# Patient Record
Sex: MALE | Race: OTHER | Employment: OTHER | URBAN - METROPOLITAN AREA
[De-identification: names, ages, dates, MRNs, and addresses within clinical notes are randomized per-mention and may not be internally consistent; named-entity substitution may affect disease eponyms.]

---

## 2017-06-24 PROBLEM — E87.1 HYPONATREMIA: Status: ACTIVE | Noted: 2017-06-24

## 2017-06-24 PROBLEM — I10 HTN (HYPERTENSION): Status: ACTIVE | Noted: 2017-06-24

## 2017-06-24 PROBLEM — N39.0 UTI (URINARY TRACT INFECTION): Status: ACTIVE | Noted: 2017-06-24

## 2017-06-24 PROBLEM — A41.9 SEPSIS (HCC): Status: ACTIVE | Noted: 2017-06-24

## 2018-09-26 PROBLEM — N39.0 UTI (URINARY TRACT INFECTION): Status: RESOLVED | Noted: 2017-06-24 | Resolved: 2018-09-26

## 2022-11-07 ENCOUNTER — HOSPITAL ENCOUNTER (OUTPATIENT)
Dept: PHYSICAL THERAPY | Age: 72
Setting detail: THERAPIES SERIES
Discharge: HOME | End: 2022-11-07
Payer: COMMERCIAL

## 2022-11-07 PROCEDURE — 97161 PT EVAL LOW COMPLEX 20 MIN: CPT | Performed by: PHYSICAL THERAPIST

## 2022-11-07 PROCEDURE — 97110 THERAPEUTIC EXERCISES: CPT | Performed by: PHYSICAL THERAPIST

## 2022-11-07 NOTE — PLAN OF CARE
The [facility] and [address], [address]  Phone: [phone]  Fax [phone]     Physical Therapy Certification    Dear  Luz Lockett,    We had the pleasure of evaluating the following patient for physical therapy services at [address] [place] [address]. A summary of our findings can be found in the initial assessment below. This includes our plan of care. If you have any questions or concerns regarding these findings, please do not hesitate to contact me at the office phone number checked above. Thank you for the referral.       Physician Signature:_______________________________Date:__________________  By signing above (or electronic signature), therapists plan is approved by physician    Patient: Víctor Madrigal   : 1950   MRN: [id number]  Referring Physician:        Evaluation Date: 2022      Medical Diagnosis Information:  Diagnosis: M54.2 (ICD-10-CM) - Cervicalgia   Treatment Diagnosis: M54.2 pain; decreased strength                                         Insurance information: PT Insurance Information: [employer]      Precautions/ Contra-indications:      C-SSRS Triggered by Intake questionnaire (Past 2 wk assessment):   [x] No, Questionnaire did not trigger screening.   [] Yes, Patient intake triggered further evaluation      [] C-SSRS Screening completed  [] PCP notified via Plan of Care  [] Emergency services notified     Latex Allergy:  [x]NO      []YES  Preferred Language for Healthcare:   []English       [x]other: Belarusian    SUBJECTIVE: Patient stated complaint: Pt had left shoulder surgery one year ago, RTC repair, very successful. He has pain in his neck and posterior bilateral shoulders. This increases as the day progresses. He spends time on the computer. +neck stiffness. - night pain. -sensation changes.      Relevant Medical History: HBP  Functional Disability Index: FOTO 53    Pain Scale: 4-5/10  Easing factors:    Provocative factors:  typing, sitting, position changes     Type: []Constant   [x]Intermittent  []Radiating []Localized []other:     Numbness/Tingling: negative    Occupation/School: Retired     Living Status/Prior Level of Function: Independent with ADLs and IADLs    OBJECTIVE:     CERV ROM     Cervical Flexion WNL    Cervical Extension 50% pain    Cervical SB 50% tight    Cervical rotation 50%         ROM Left Right   Shoulder Flex     Shoulder Abd     Shoulder ER     Shoulder IR     Grossly WNL          Strength  Left Right   Shoulder Flex     Shoulder Scap     Shoulder ER 4/5 4/5   Shoulder IR 4/5 4/5   Deep neck flexors          Reflexes Left Right   C5-6 Biceps     C5-6 Brachioradialis     C7-8 Triceps       Reflexes/Sensation:     []Dermatomes/Myotomes intact    []Reflexes equal and normal bilaterally   []Other:    Joint mobility:     [x]Normal    []Hypo   []Hyper    Palpation:      Functional Mobility/Transfers: WFL    Posture: mildly forward head    Bandages/Dressings/Incisions: NA     Gait: (include devices/WB status): WNL     Orthopedic Special Tests:                         [x] Patient history, allergies, meds reviewed. Medical chart reviewed. See intake form. Review Of Systems (ROS):  [x]Performed Review of systems (Integumentary, CardioPulmonary, Neurological) by intake and observation. Intake form has been scanned into medical record. Patient has been instructed to contact their primary care physician regarding ROS issues if not already being addressed at this time.       Co-morbidities/Complexities (which will affect course of rehabilitation):    []None           Arthritic conditions   []Rheumatoid arthritis (M05.9)  []Osteoarthritis (M19.91)   Cardiovascular conditions   [x]Hypertension (I10)  []Hyperlipidemia (E78.5)  []Angina pectoris (I20)  []Atherosclerosis (I70)  []CVA Musculoskeletal conditions   []Disc pathology   []Congenital spine pathologies []Prior surgical intervention  []Osteoporosis (M81.8)  []Osteopenia (M85.8)   Endocrine conditions   []Hypothyroid (E03.9)  []Hyperthyroid Gastrointestinal conditions   []Constipation (T82.94)   Metabolic conditions   []Morbid obesity (E66.01)  []Diabetes type 1(E10.65) or 2 (E11.65)   []Neuropathy (G60.9)     Pulmonary conditions   []Asthma (J45)  []Coughing   []COPD (J44.9)   Psychological Disorders  []Anxiety (F41.9)  []Depression (F32.9)   []Other:   []Other:          Barriers to/and or personal factors that will affect rehab potential:              []Age  []Sex   []Smoker              []Motivation/Lack of Motivation                        []Co-Morbidities              []Cognitive Function, education/learning barriers              []Environmental, home barriers              []profession/work barriers  []past PT/medical experience  []other:  Justification:  Pt has a good rehab potential.     Falls Risk Assessment (30 days):   [x] Falls Risk assessed and no intervention required.   [] Falls Risk assessed and Patient requires intervention due to being higher risk   TUG score (>12s at risk):     [] Falls education provided, including         ASSESSMENT:     Functional Impairments:     []Noted cervical/thoracic/GHJ joint hypomobility   []Noted cervical/thoracic/GHJ joint hypermobility   []Decreased cervical/UE functional ROM   []Noted Headache pain aggravated by neck movements with/without dizziness   []Abnormal reflexes/sensation/myotomal/dermatomal deficits   []Decreased DCF control or ability to hold head up   [x]Decreased RC/scapular/core strength and neuromuscular control    []Decreased UE functional strength   []other:      Functional Activity Limitations (from functional questionnaire and intake)   [x]Reduced ability to tolerate prolonged functional positions   [x]Reduced ability or difficulty with changes of positions or transfers between positions   []Reduced ability to maintain good posture and demonstrate good body mechanics with sitting, bending, and lifting   [x] Reduced ability or tolerance with driving and/or computer work   []Reduced ability to perform lifting, reaching, carrying tasks   []Reduced ability to concentrate   []Reduced ability to sleep    []Reduced ability to tolerate any impact through UE or spine   []Reduced ability to ambulate prolonged functional periods/distances   []other:    Participation Restrictions   []Reduced participation in self care activities   []Reduced participation in home management activities   []Reduced participation in work activities   [x]Reduced participation in social activities. []Reduced participation in sport/recreational activities. Classification/Subgrouping:   []signs/symptoms consistent with neck pain with mobility deficits     []signs/symptoms consistent with neck pain with movement coordinated impairments    []signs/symptoms consistent with neck pain with radiating pain    []signs/symptoms consistent with neck pain with headaches (cervicogenic)    []Signs/symptoms consistent with nerve root involvement including myotome & dermatome dysfunction   []sign/symptoms consistent with facet dysfunction of cervical and thoracic spine    []signs/symptoms consistent suggesting central cord compression/UMN syndromes   []signs/symptoms consistent with discogenic cervical pain   []signs/symptoms consistent with rib dysfunction   [x]signs/symptoms consistent with postural dysfunction   []signs/symptoms consistent with shoulder pathology    []signs/symptoms consistent with post-surgical status including decreased ROM, strength and function.    []signs/symptoms consistent with pathology which may benefit from Dry Needling   []signs/symptoms which may limit the use of advanced manual therapy techniques: (Elevated CV risk profile, recent trauma, intolerance to end range positions, prior TIA, visual issues, UE neurological compromise )     Prognosis/Rehab Potential: []Excellent   [x]Good    []Fair   []Poor    Tolerance of evaluation/treatment:    []Excellent   [x]Good    []Fair   []Poor    Physical Therapy Evaluation Complexity Justification  [x] A history of present problem with:  [x] no personal factors and/or comorbidities that impact the plan of care;  []1-2 personal factors and/or comorbidities that impact the plan of care  []3 personal factors and/or comorbidities that impact the plan of care  [x] An examination of body systems using standardized tests and measures addressing any of the following: body structures and functions (impairments), activity limitations, and/or participation restrictions;:  [] a total of 1-2 or more elements   [x] a total of 3 or more elements   [] a total of 4 or more elements   [x] A clinical presentation with:  [x] stable and/or uncomplicated characteristics   [] evolving clinical presentation with changing characteristics  [] unstable and unpredictable characteristics;   [x] Clinical decision making of [x] low, [] moderate, [] high complexity using standardized patient assessment instrument and/or measurable assessment of functional outcome. [x] EVAL (LOW) 23948 (typically 20 minutes face-to-face)  [] EVAL (MOD) 17529 (typically 30 minutes face-to-face)  [] EVAL (HIGH) 95766 (typically 45 minutes face-to-face)  [] RE-EVAL     PLAN:   Frequency/Duration:  1-2 days per week for 6 Weeks:  Interventions:  [x]  Therapeutic exercise including: strength training, ROM, for cervical spine,scapula, core and Upper extremity, including postural re-education. [x]  NMR activation and proprioception for Deep cervical flexors, periscapular and RC muscles and Core, including postural re-education. [x]  Manual therapy as indicated for C/T spine, ribs, Soft tissue to include: Dry Needling/IASTM, STM, PROM, Gr I-IV mobilizations, manipulation.    [x] Modalities as needed that may include: thermal agents, E-stim, Biofeedback, US, iontophoresis as indicated  [x] Patient education on joint protection, postural re-education, activity modification, progression of HEP. HEP instruction: Pt received a written HEP today. GOALS:  Patient stated goal: typing better  [] Progressing: [] Met: [] Not Met: [] Adjusted    Therapist goals for Patient:   Short Term Goals: To be achieved in: 2 weeks  1. Independent in HEP and progression per patient tolerance, in order to prevent re-injury. [] Progressing: [] Met: [] Not Met: [] Adjusted  2. Patient will have a decrease in pain to facilitate improvement in movement, function, and ADLs as indicated by Functional Deficits. [] Progressing: [] Met: [] Not Met: [] Adjusted    Long Term Goals: To be achieved in: 6 weeks  1. FOTO 63 to assist with reaching prior level of function. [] Progressing: [] Met: [] Not Met: [] Adjusted  2. Patient will demonstrate [facility] cervical/thoracic spine to allow for proper joint functioning as indicated by patients Functional Deficits. [] Progressing: [] Met: [] Not Met: [] Adjusted  3. Patient will demonstrate an increase in postural awareness and control and activation of  Deep cervical stabilizers to allow for proper functional mobility as indicated by patients Functional Deficits. [] Progressing: [] Met: [] Not Met: [] Adjusted   4. Patient will return to  functional activities without increased symptoms or restriction. [] Progressing: [] Met: [] Not Met: [] Adjusted  5. Pt will type without pain.   [] Progressing: [] Met: [] Not Met: [] Adjusted      Electronically signed by:  Collette Hone, PT

## 2022-11-07 NOTE — FLOWSHEET NOTE
The [address] [facility] and [address], [address]  Phone: [phone]  Fax [phone]      Physical Therapy Treatment Note/ Progress Report:       Date:  2022    Patient Name:  Saige Morgan    :  1950  MRN: [id number]  Restrictions/Precautions:    Medical/Treatment Diagnosis Information:  Diagnosis: M54.2 (ICD-10-CM) - Cervicalgia  Treatment Diagnosis: M54.2 pain; decreased strength  Insurance/Certification information:  PT Insurance Information: [facility]  Physician Information:   Fahad Lucero NP  Has the plan of care been signed (Y/N):        []  Yes  [x]  No     Date of Patient follow up with Physician:      Is this a Progress Report:     []  Yes  [x]  No        If Yes:  Date Range for reporting period:  Beginning 22  Ending     Progress report will be due (10 Rx or 30 days whichever is less):         Recertification will be due (POC Duration  / 90 days whichever is less):  23        Visit # Insurance Allowable Auth Required   In-person 1  30 []  Yes []  No    Telehealth     []  Yes []  No    Total        Functional Scale: FOTO=53    Date assessed:  22     Latex Allergy:  [x]NO      []YES  Preferred Language for Healthcare:   []English       [x]other: Yoruba    Pain level:  4-5/10     SUBJECTIVE:  See eval    OBJECTIVE: See eval  Observation:   Test measurements:      RESTRICTIONS/PRECAUTIONS:      Exercises/Interventions:   Therapeutic Ex        Sets/sec Reps Notes   Neck retraction 1x10     Scapular retraction [id number]     Standing extension [id number]     UT stretch [id number]     Prone HABD 3x10                                                                 Manual Intervention                                                     NMR re-education                                        Traction                                     Therapeutic Exercise and NMR EXR  [x] (88728) Provided verbal/tactile cueing for activities related to strengthening, flexibility, endurance, ROM  for improvements in cervical, postural, scapular, scapulothoracic and UE control with self care, reaching, carrying, lifting, house/yardwork, driving/computer work.    [] (79681) Provided verbal/tactile cueing for activities related to improving balance, coordination, kinesthetic sense, posture, motor skill, proprioception  to assist with cervical, scapular, scapulothoracic and UE control with self care, reaching, carrying, lifting, house/yardwork, driving/computer work. Therapeutic Activities:    [] (17556 or 15851) Provided verbal/tactile cueing for activities related to improving balance, coordination, kinesthetic sense, posture, motor skill, proprioception and motor activation to allow for proper function of cervical, scapular, scapulothoracic and UE control with self care, carrying, lifting, driving/computer work.      Home Exercise Program:    [x] (22443) Reviewed/Progressed HEP activities related to strengthening, flexibility, endurance, ROM of cervical, scapular, scapulothoracic and UE control with self care, reaching, carrying, lifting, house/yardwork, driving/computer work  [] (67815) Reviewed/Progressed HEP activities related to improving balance, coordination, kinesthetic sense, posture, motor skill, proprioception of cervical, scapular, scapulothoracic and UE control with self care, reaching, carrying, lifting, house/yardwork, driving/computer work      Manual Treatments:  PROM / STM / Oscillations-Mobs:  G-I, II, III, IV (PA's, Inf., Post.)  [] (49737) Provided manual therapy to mobilize soft tissue/joints of cervical/CT, scapular GHJ and UE for the purpose of decreasing headache, modulating pain, promoting relaxation,  increasing ROM, reducing/eliminating soft tissue swelling/inflammation/restriction, improving soft tissue extensibility and allowing for proper ROM for normal function with self care, reaching, carrying, lifting, house/yardwork, driving/computer work    Modalities:      Charges  Timed Code Treatment Minutes: 20   Total Treatment Minutes: 40     [x] EVAL (LOW) 36423   [] EVAL (MOD) 06269   [] EVAL (HIGH) 61698   [] RE-EVAL     [x] TA(14189) x  1   [] IONTO  [] NMR (80923) x     [] VASO  [] Manual (87625) x      [] Other:  [] TA x      [] Mech Traction (13290)  [] ES(attended) (13831)      [] ES (un) (62549):       GOALS:  Patient stated goal: typing better  [] Progressing: [] Met: [] Not Met: [] Adjusted     Therapist goals for Patient:   Short Term Goals: To be achieved in: 2 weeks  1. Independent in HEP and progression per patient tolerance, in order to prevent re-injury. [] Progressing: [] Met: [] Not Met: [] Adjusted  2. Patient will have a decrease in pain to facilitate improvement in movement, function, and ADLs as indicated by Functional Deficits. [] Progressing: [] Met: [] Not Met: [] Adjusted     Long Term Goals: To be achieved in: 6 weeks  1. FOTO 63 to assist with reaching prior level of function. [] Progressing: [] Met: [] Not Met: [] Adjusted  2. Patient will demonstrate increased AROM to [facility] of cervical/thoracic spine to allow for proper joint functioning as indicated by patients Functional Deficits. [] Progressing: [] Met: [] Not Met: [] Adjusted  3. Patient will demonstrate an increase in postural awareness and control and activation of  Deep cervical stabilizers to allow for proper functional mobility as indicated by patients Functional Deficits. [] Progressing: [] Met: [] Not Met: [] Adjusted   4. Patient will return to  functional activities without increased symptoms or restriction. [] Progressing: [] Met: [] Not Met: [] Adjusted  5. Pt will type without pain. [] Progressing: [] Met: [] Not Met: [] Adjusted     Overall Progression Towards Functional goals/ Treatment Progress Update:  [] Patient is progressing as expected towards functional goals listed.     [] Progression is slowed due to complexities/Impairments listed. [] Progression has been slowed due to co-morbidities. [x] Plan just implemented, too soon to assess goals progression <30days   [] Goals require adjustment due to lack of progress  [] Patient is not progressing as expected and requires additional follow up with physician  [] Other    Prognosis for POC: [x] Good [] Fair  [] Poor      Patient requires continued skilled intervention: [x] Yes  [] No      ASSESSMENT:  See eval    Patient Education:  Access Code: [facility]  URL: [email or web address]  Date: 11/07/2022  Prepared by: Karl Ormond    Exercises  Shoulder Extension with Resistance - 1 x daily - 7 x weekly - 3 sets - 10 reps  Scapular Retraction with Resistance - 1 x daily - 7 x weekly - 3 sets - 10 reps  Prone Shoulder Horizontal Abduction - 1 x daily - 7 x weekly - 3 sets - 10 reps  Standing Cervical Retraction - 1 x daily - 7 x weekly - 1 sets - 10 reps  Seated Cervical Sidebending Stretch - 1 x daily - 7 x weekly - 3 reps - 10 sec hold      Treatment/Activity Tolerance:  [x] Patient tolerated treatment well [x] Patient limited by fatigue  [] Patient limited by pain  [] Patient limited by other medical complications  [] Other:     Prognosis: [x] Good [] Fair  [] Poor    Patient Requires Follow-up: [x] Yes  [] No    PLAN: See eval  [] Continue per plan of care [] Alter current plan (see comments above)  [x] Plan of care initiated [] Hold pending MD visit [] Discharge      Electronically signed by:  Karl Ormond, PT    Note: If patient does not return for scheduled/ recommended follow up visits, this note will serve as a discharge from care along with most recent update on progress.

## 2022-11-17 ENCOUNTER — HOSPITAL ENCOUNTER (OUTPATIENT)
Dept: PHYSICAL THERAPY | Age: 72
Setting detail: THERAPIES SERIES
Discharge: HOME | End: 2022-11-17
Payer: COMMERCIAL

## 2022-11-17 PROCEDURE — 97110 THERAPEUTIC EXERCISES: CPT | Performed by: PHYSICAL THERAPIST

## 2022-11-17 PROCEDURE — 97530 THERAPEUTIC ACTIVITIES: CPT | Performed by: PHYSICAL THERAPIST

## 2022-11-18 NOTE — FLOWSHEET NOTE
[address], [facility], [address]  Phone: [phone]  Fax [phone]      Physical Therapy Treatment Note/ Progress Report:       Date:  2022    Patient Name:  Benjamin Leroy    :  1950  MRN: [id number]  Restrictions/Precautions:    Medical/Treatment Diagnosis Information:  Diagnosis: M54.2 (ICD-10-CM) - Cervicalgia  Treatment Diagnosis: M54.2 pain; decreased strength  Insurance/Certification information:  [place] [facility]  Physician Information:   Geoff Cintron NP  Has the plan of care been signed (Y/N):        []  Yes  [x]  No     Date of Patient follow up with Physician:      Is this a Progress Report:     []  Yes  [x]  No        If Yes:  Date Range for reporting period:  Beginning 22  Ending     Progress report will be due (10 Rx or 30 days whichever is less):  84       Recertification will be due (POC Duration  / 90 days whichever is less):  23        Visit # Insurance Allowable Auth Required   In-person 2  30 []  Yes []  No    Telehealth     []  Yes []  No    Total        Functional Scale: FOTO=53    Date assessed:  22     Latex Allergy:  [x]NO      []YES  Preferred Language for Healthcare:   []English       [x]other: Hebrew    Pain level:  4-5/10     SUBJECTIVE:  Pt has no new c/o's today.  +HEP    OBJECTIVE:   Observation:   Test measurements:      RESTRICTIONS/PRECAUTIONS:      Exercises/Interventions:   Therapeutic Ex        Sets/sec Reps Notes   Neck retraction 1x10     Scapular retraction [id number]     Standing extension [id number]     UT stretch [id number]     Prone HABD 3x10     Serratus  2# 3x10     ER 2# 3x10     CC seated retraction                                                Manual Intervention                                                     NMR re-education                                        Traction Therapeutic Exercise and NMR EXR  [x] (29241) Provided verbal/tactile cueing for activities related to strengthening, flexibility, endurance, ROM  for improvements in cervical, postural, scapular, scapulothoracic and UE control with self care, reaching, carrying, lifting, house/yardwork, driving/computer work.    [] (73170) Provided verbal/tactile cueing for activities related to improving balance, coordination, kinesthetic sense, posture, motor skill, proprioception  to assist with cervical, scapular, scapulothoracic and UE control with self care, reaching, carrying, lifting, house/yardwork, driving/computer work. Therapeutic Activities:    [] (73917 or 12660) Provided verbal/tactile cueing for activities related to improving balance, coordination, kinesthetic sense, posture, motor skill, proprioception and motor activation to allow for proper function of cervical, scapular, scapulothoracic and UE control with self care, carrying, lifting, driving/computer work.      Home Exercise Program:    [x] (89971) Reviewed/Progressed HEP activities related to strengthening, flexibility, endurance, ROM of cervical, scapular, scapulothoracic and UE control with self care, reaching, carrying, lifting, house/yardwork, driving/computer work  [] (21124) Reviewed/Progressed HEP activities related to improving balance, coordination, kinesthetic sense, posture, motor skill, proprioception of cervical, scapular, scapulothoracic and UE control with self care, reaching, carrying, lifting, house/yardwork, driving/computer work      Manual Treatments:  PROM / STM / Oscillations-Mobs:  G-I, II, III, IV (PA's, Inf., Post.)  [] (07885) Provided manual therapy to mobilize soft tissue/joints of cervical/CT, scapular GHJ and UE for the purpose of decreasing headache, modulating pain, promoting relaxation,  increasing ROM, reducing/eliminating soft tissue swelling/inflammation/restriction, improving soft tissue extensibility and allowing for proper ROM for normal function with self care, reaching, carrying, lifting, house/yardwork, driving/computer work    Modalities:      Charges  Timed Code Treatment Minutes: 40   Total Treatment Minutes: 40     [] EVAL (LOW) 52276   [] EVAL (MOD) 64704   [] EVAL (HIGH) 04246   [] RE-EVAL     [x] RU(60394) x 2   [] IONTO  [] NMR (11941) x     [] VASO  [] Manual (97009) x      [] Other:  [x] TA x  1    [] Mech Traction (47708)  [] ES(attended) (47894)      [] ES (un) (14893):       GOALS:  Patient stated goal: typing better  [] Progressing: [] Met: [] Not Met: [] Adjusted     Therapist goals for Patient:   Short Term Goals: To be achieved in: 2 weeks  1. Independent in HEP and progression per patient tolerance, in order to prevent re-injury. [] Progressing: [] Met: [] Not Met: [] Adjusted  2. Patient will have a decrease in pain to facilitate improvement in movement, function, and ADLs as indicated by Functional Deficits. [] Progressing: [] Met: [] Not Met: [] Adjusted     Long Term Goals: To be achieved in: 6 weeks  1. FOTO 63 to assist with reaching prior level of function. [] Progressing: [] Met: [] Not Met: [] Adjusted  2. Patient will demonstrate increased AROM to [facility] of cervical/thoracic spine to allow for proper joint functioning as indicated by patients Functional Deficits. [] Progressing: [] Met: [] Not Met: [] Adjusted  3. Patient will demonstrate an increase in postural awareness and control and activation of  Deep cervical stabilizers to allow for proper functional mobility as indicated by patients Functional Deficits. [] Progressing: [] Met: [] Not Met: [] Adjusted   4. Patient will return to  functional activities without increased symptoms or restriction. [] Progressing: [] Met: [] Not Met: [] Adjusted  5. Pt will type without pain.   [] Progressing: [] Met: [] Not Met: [] Adjusted     Overall Progression Towards Functional goals/ Treatment Progress Update:  [] Patient is progressing as expected towards functional goals listed. [] Progression is slowed due to complexities/Impairments listed. [] Progression has been slowed due to co-morbidities. [x] Plan just implemented, too soon to assess goals progression <30days   [] Goals require adjustment due to lack of progress  [] Patient is not progressing as expected and requires additional follow up with physician  [] Other    Prognosis for POC: [x] Good [] Fair  [] Poor      Patient requires continued skilled intervention: [x] Yes  [] No      ASSESSMENT:  Pt tolerates the exercises well. Focus on shoulder strengthening exercises, scapular strengthening. Patient Education:  Access Code: [facility]  URL: [email or web address]  Date: 11/07/2022  Prepared by: Carrington Rivers    Exercises  Shoulder Extension with Resistance - 1 x daily - 7 x weekly - 3 sets - 10 reps  Scapular Retraction with Resistance - 1 x daily - 7 x weekly - 3 sets - 10 reps  Prone Shoulder Horizontal Abduction - 1 x daily - 7 x weekly - 3 sets - 10 reps  Standing Cervical Retraction - 1 x daily - 7 x weekly - 1 sets - 10 reps  Seated Cervical Sidebending Stretch - 1 x daily - 7 x weekly - 3 reps - 10 sec hold  Updated 11/17/22    Treatment/Activity Tolerance:  [x] Patient tolerated treatment well [x] Patient limited by fatigue  [] Patient limited by pain  [] Patient limited by other medical complications  [] Other:     Prognosis: [x] Good [] Fair  [] Poor    Patient Requires Follow-up: [x] Yes  [] No    PLAN: Strength program.   [x] Continue per plan of care [] Alter current plan (see comments above)  [] Plan of care initiated [] Hold pending MD visit [] Discharge      Electronically signed by:  Carrington Rivers PT    Note: If patient does not return for scheduled/ recommended follow up visits, this note will serve as a discharge from care along with most recent update on progress.

## 2022-11-22 ENCOUNTER — HOSPITAL ENCOUNTER (OUTPATIENT)
Dept: PHYSICAL THERAPY | Age: 72
Setting detail: THERAPIES SERIES
Discharge: HOME | End: 2022-11-22
Payer: COMMERCIAL

## 2022-11-22 PROCEDURE — 97110 THERAPEUTIC EXERCISES: CPT | Performed by: PHYSICAL THERAPIST

## 2022-11-22 PROCEDURE — 97530 THERAPEUTIC ACTIVITIES: CPT | Performed by: PHYSICAL THERAPIST

## 2022-11-22 NOTE — FLOWSHEET NOTE
The [address] [place] [address],[facility] [[email or web address]] [address], [address]  Phone: [phone]  Fax [phone]      Physical Therapy Treatment Note/ Progress Report:       Date:  2022    Patient Name:  Rika Alfredo    :  1950  MRN: [id number]  Restrictions/Precautions:    Medical/Treatment Diagnosis Information:  Diagnosis: M54.2 (ICD-10-CM) - Cervicalgia  Treatment Diagnosis: M54.2 pain; decreased strength  Insurance/Certification information:  PT Insurance Information: [facility]  Physician Information:   Pippa Wright NP  Has the plan of care been signed (Y/N):        []  Yes  [x]  No     Date of Patient follow up with Physician:      Is this a Progress Report:     []  Yes  [x]  No        If Yes:  Date Range for reporting period:  Beginning 22  Ending     Progress report will be due (10 Rx or 30 days whichever is less):         Recertification will be due (POC Duration  / 90 days whichever is less):  23        Visit # Insurance Allowable Auth Required   In-person 3  30 []  Yes []  No    Telehealth     []  Yes []  No    Total        Functional Scale: FOTO=53    Date assessed:  22     Latex Allergy:  [x]NO      []YES  Preferred Language for Healthcare:   []English       [x]other: Arabic    Pain level:  4-5/10     SUBJECTIVE:  Pt has no new c/o's today. He states he is doing well.      OBJECTIVE:   Observation:   Test measurements:      RESTRICTIONS/PRECAUTIONS:      Exercises/Interventions:   Therapeutic Ex        Sets/sec Reps Notes   Neck retraction 2x10     Scapular retraction 3x10 Blue     Standing extension 3x10 Green     UT stretch 5m70mzp     Prone HABD 3x10 1#    Serratus  3# 3x10     ER 3# 3x10     CC seated retraction 15# 3x10    Standing extension CC 5# 3x10                                        Manual Intervention                                                     NMR re-education Traction                                     Therapeutic Exercise and NMR EXR  [x] (60022) Provided verbal/tactile cueing for activities related to strengthening, flexibility, endurance, ROM  for improvements in cervical, postural, scapular, scapulothoracic and UE control with self care, reaching, carrying, lifting, house/yardwork, driving/computer work.    [] (82632) Provided verbal/tactile cueing for activities related to improving balance, coordination, kinesthetic sense, posture, motor skill, proprioception  to assist with cervical, scapular, scapulothoracic and UE control with self care, reaching, carrying, lifting, house/yardwork, driving/computer work. Therapeutic Activities:    [x] (71752 or 34085) Provided verbal/tactile cueing for activities related to improving balance, coordination, kinesthetic sense, posture, motor skill, proprioception and motor activation to allow for proper function of cervical, scapular, scapulothoracic and UE control with self care, carrying, lifting, driving/computer work.      Home Exercise Program:    [x] (84080) Reviewed/Progressed HEP activities related to strengthening, flexibility, endurance, ROM of cervical, scapular, scapulothoracic and UE control with self care, reaching, carrying, lifting, house/yardwork, driving/computer work  [] (16889) Reviewed/Progressed HEP activities related to improving balance, coordination, kinesthetic sense, posture, motor skill, proprioception of cervical, scapular, scapulothoracic and UE control with self care, reaching, carrying, lifting, house/yardwork, driving/computer work      Manual Treatments:  PROM / STM / Oscillations-Mobs:  G-I, II, III, IV (PA's, Inf., Post.)  [] (22031) Provided manual therapy to mobilize soft tissue/joints of cervical/CT, scapular GHJ and UE for the purpose of decreasing headache, modulating pain, promoting relaxation,  increasing ROM, reducing/eliminating soft tissue swelling/inflammation/restriction, improving soft tissue extensibility and allowing for proper ROM for normal function with self care, reaching, carrying, lifting, house/yardwork, driving/computer work    Modalities:      Charges  Timed Code Treatment Minutes: 40   Total Treatment Minutes: 40     [] EVAL (LOW) 26600   [] EVAL (MOD) 17405   [] EVAL (HIGH) 08850   [] RE-EVAL     [x] IK(88378) x 2   [] IONTO  [] NMR (48367) x     [] VASO  [] Manual (64737) x      [] Other:  [x] TA x  1    [] Mech Traction (82065)  [] ES(attended) (99719)      [] ES (un) (96853):       GOALS:  Patient stated goal: typing better  [] Progressing: [] Met: [] Not Met: [] Adjusted     Therapist goals for Patient:   Short Term Goals: To be achieved in: 2 weeks  1. Independent in HEP and progression per patient tolerance, in order to prevent re-injury. [] Progressing: [] Met: [] Not Met: [] Adjusted  2. Patient will have a decrease in pain to facilitate improvement in movement, function, and ADLs as indicated by Functional Deficits. [] Progressing: [] Met: [] Not Met: [] Adjusted     Long Term Goals: To be achieved in: 6 weeks  1. FOTO 63 to assist with reaching prior level of function. [] Progressing: [] Met: [] Not Met: [] Adjusted  2. Patient will demonstrate increased AROM to [facility] of cervical/thoracic spine to allow for proper joint functioning as indicated by patients Functional Deficits. [] Progressing: [] Met: [] Not Met: [] Adjusted  3. Patient will demonstrate an increase in postural awareness and control and activation of  Deep cervical stabilizers to allow for proper functional mobility as indicated by patients Functional Deficits. [] Progressing: [] Met: [] Not Met: [] Adjusted   4. Patient will return to  functional activities without increased symptoms or restriction. [] Progressing: [] Met: [] Not Met: [] Adjusted  5. Pt will type without pain.   [] Progressing: [] Met: [] Not Met: [] Adjusted     Overall Progression Towards Functional goals/ Treatment Progress Update:  [] Patient is progressing as expected towards functional goals listed. [] Progression is slowed due to complexities/Impairments listed. [] Progression has been slowed due to co-morbidities. [x] Plan just implemented, too soon to assess goals progression <30days   [] Goals require adjustment due to lack of progress  [] Patient is not progressing as expected and requires additional follow up with physician  [] Other    Prognosis for POC: [x] Good [] Fair  [] Poor      Patient requires continued skilled intervention: [x] Yes  [] No      ASSESSMENT:  Pt is progressing well with the strengthening program for the neck, RTC, scapular muscles and upper back. Patient Education:  Access Code: [facility]  URL: [email or web address]  Date: 11/22/2022  Prepared by: Kamila Valero    Exercises  Shoulder Extension with Resistance - 1 x daily - 7 x weekly - 3 sets - 10 reps  Scapular Retraction with Resistance - 1 x daily - 7 x weekly - 3 sets - 10 reps  Prone Shoulder Horizontal Abduction - 1 x daily - 7 x weekly - 3 sets - 10 reps  Standing Cervical Retraction - 1 x daily - 7 x weekly - 2 sets - 10 reps  Seated Cervical Sidebending Stretch - 1 x daily - 7 x weekly - 3 reps - 10 sec hold  Shoulder External Rotation and Scapular Retraction with Resistance - 1 x daily - 7 x weekly - 3 sets - 10 reps  Sidelying Shoulder External Rotation - 1 x daily - 7 x weekly - 3 sets - 10 reps  Supine Scapular Protraction in Flexion with Dumbbells - 1 x daily - 7 x weekly - 3 sets - 10 reps  Seated Lat Pull Down with Resistance - Elbows Bent - 1 x daily - 7 x weekly - 3 sets - 10 reps  Wall [employer] - 1 x daily - 7 x weekly - 3 sets - 10 reps    Treatment/Activity Tolerance:  [x] Patient tolerated treatment well [x] Patient limited by fatigue  [] Patient limited by pain  [] Patient limited by other medical complications  [] Other:     Prognosis: [x] Good [] Fair  [] Poor    Patient Requires Follow-up: [x] Yes  [] No    PLAN: Strength program.   [x] Continue per plan of care [] Alter current plan (see comments above)  [] Plan of care initiated [] Hold pending MD visit [] Discharge      Electronically signed by:  Cathy Barney PT    Note: If patient does not return for scheduled/ recommended follow up visits, this note will serve as a discharge from care along with most recent update on progress.

## 2022-11-29 ENCOUNTER — HOSPITAL ENCOUNTER (OUTPATIENT)
Dept: PHYSICAL THERAPY | Age: 72
Setting detail: THERAPIES SERIES
Discharge: HOME | End: 2022-11-29
Payer: COMMERCIAL

## 2022-11-29 PROCEDURE — 97530 THERAPEUTIC ACTIVITIES: CPT | Performed by: PHYSICAL THERAPIST

## 2022-11-29 PROCEDURE — 97110 THERAPEUTIC EXERCISES: CPT | Performed by: PHYSICAL THERAPIST

## 2022-11-29 NOTE — FLOWSHEET NOTE
[address],[facility] [address], [address]  Phone: [phone]  Fax [phone]      Physical Therapy Treatment Note/ Progress Report:       Date:  2022    Patient Name:  Zia Jenkins    :  1950  MRN: [id number]  Restrictions/Precautions:    Medical/Treatment Diagnosis Information:  Diagnosis: M54.2 (ICD-10-CM) - Cervicalgia  Treatment Diagnosis: M54.2 pain; decreased strength  Insurance/Certification information:  [facility] [facility]  Physician Information:   Fran Elder NP  Has the plan of care been signed (Y/N):        []  Yes  [x]  No     Date of Patient follow up with Physician:      Is this a Progress Report:     []  Yes  [x]  No        If Yes:  Date Range for reporting period:  Beginning 22  Ending     Progress report will be due (10 Rx or 30 days whichever is less):         Recertification will be due (POC Duration  / 90 days whichever is less):  23        Visit # Insurance Allowable Auth Required   In-person 4  30 []  Yes []  No    Telehealth     []  Yes []  No    Total        Functional Scale: FOTO=53    Date assessed:  22     Latex Allergy:  [x]NO      []YES  Preferred Language for Healthcare:   []English       [x]other: Vietnamese    Pain level:  4-5/10     SUBJECTIVE:  Pt feels good today. No new c/o's to report.      OBJECTIVE:   Observation:   Test measurements:      RESTRICTIONS/PRECAUTIONS:      Exercises/Interventions:   Therapeutic Ex        Sets/sec Reps Notes   Neck retraction 3x10     Scapular retraction 3x10 CC 15#     Standing extension 3x10 CC 5#     UT stretch     Prone HABD 1# 3x10     Serratus  4# 3x10     ER 4# 3x10     CC seated retraction 15# 3x10                                              Manual Intervention                                                     NMR re-education          Bilateral ER  Red 3x10     Wall angels 2x10 improving soft tissue extensibility and allowing for proper ROM for normal function with self care, reaching, carrying, lifting, house/yardwork, driving/computer work    Modalities:      Charges  Timed Code Treatment Minutes: 45   Total Treatment Minutes: 45     [] EVAL (LOW) 44700   [] EVAL (MOD) 98980   [] EVAL (HIGH) 36346   [] RE-EVAL     [x] DP(91989) x 2   [] IONTO  [] NMR (48212) x     [] VASO  [] Manual (54261) x      [] Other:  [x] TA x  1    [] Mech Traction (52801)  [] ES(attended) (03586)      [] ES (un) (29105):       GOALS:  Patient stated goal: typing better  [] Progressing: [] Met: [] Not Met: [] Adjusted     Therapist goals for Patient:   Short Term Goals: To be achieved in: 2 weeks  1. Independent in HEP and progression per patient tolerance, in order to prevent re-injury. [] Progressing: [] Met: [] Not Met: [] Adjusted  2. Patient will have a decrease in pain to facilitate improvement in movement, function, and ADLs as indicated by Functional Deficits. [] Progressing: [] Met: [] Not Met: [] Adjusted     Long Term Goals: To be achieved in: 6 weeks  1. FOTO 63 to assist with reaching prior level of function. [] Progressing: [] Met: [] Not Met: [] Adjusted  2. Patient will demonstrate increased AROM to [facility] of cervical/thoracic spine to allow for proper joint functioning as indicated by patients Functional Deficits. [] Progressing: [] Met: [] Not Met: [] Adjusted  3. Patient will demonstrate an increase in postural awareness and control and activation of  Deep cervical stabilizers to allow for proper functional mobility as indicated by patients Functional Deficits. [] Progressing: [] Met: [] Not Met: [] Adjusted   4. Patient will return to  functional activities without increased symptoms or restriction. [] Progressing: [] Met: [] Not Met: [] Adjusted  5. Pt will type without pain.   [] Progressing: [] Met: [] Not Met: [] Adjusted     Overall Progression Towards Functional goals/ Treatment Progress Update:  [] Patient is progressing as expected towards functional goals listed. [] Progression is slowed due to complexities/Impairments listed. [] Progression has been slowed due to co-morbidities. [x] Plan just implemented, too soon to assess goals progression <30days   [] Goals require adjustment due to lack of progress  [] Patient is not progressing as expected and requires additional follow up with physician  [] Other    Prognosis for POC: [x] Good [] Fair  [] Poor      Patient requires continued skilled intervention: [x] Yes  [] No      ASSESSMENT:  Pt tolerates the exercises well. He has improved strength. Advancing exercises for the shoulder, neck and upper back. He has good exercise technique. Patient Education:  Access Code: [facility]  URL: [email or web address]  Date: 11/22/2022  Prepared by: David Manning    Exercises  Shoulder Extension with Resistance - 1 x daily - 7 x weekly - 3 sets - 10 reps  Scapular Retraction with Resistance - 1 x daily - 7 x weekly - 3 sets - 10 reps  Prone Shoulder Horizontal Abduction - 1 x daily - 7 x weekly - 3 sets - 10 reps  Standing Cervical Retraction - 1 x daily - 7 x weekly - 2 sets - 10 reps  Seated Cervical Sidebending Stretch - 1 x daily - 7 x weekly - 3 reps - 10 sec hold  Shoulder External Rotation and Scapular Retraction with Resistance - 1 x daily - 7 x weekly - 3 sets - 10 reps  Sidelying Shoulder External Rotation - 1 x daily - 7 x weekly - 3 sets - 10 reps  Supine Scapular Protraction in Flexion with Dumbbells - 1 x daily - 7 x weekly - 3 sets - 10 reps  Seated Lat Pull Down with Resistance - Elbows Bent - 1 x daily - 7 x weekly - 3 sets - 10 reps  Wall [employer] - 1 x daily - 7 x weekly - 3 sets - 10 reps    Treatment/Activity Tolerance:  [x] Patient tolerated treatment well [x] Patient limited by fatigue  [] Patient limited by pain  [] Patient limited by other medical complications  [] Other:     Prognosis: [x] Good [] Fair  [] Poor    Patient Requires Follow-up: [x] Yes  [] No    PLAN: Strength program.   [x] Continue per plan of care [] Alter current plan (see comments above)  [] Plan of care initiated [] Hold pending MD visit [] Discharge      Electronically signed by:  Durene Dandy, PT    Note: If patient does not return for scheduled/ recommended follow up visits, this note will serve as a discharge from care along with most recent update on progress.

## 2022-12-06 ENCOUNTER — HOSPITAL ENCOUNTER (OUTPATIENT)
Dept: PHYSICAL THERAPY | Age: 72
Setting detail: THERAPIES SERIES
Discharge: HOME | End: 2022-12-06
Payer: COMMERCIAL

## 2022-12-06 PROCEDURE — 97110 THERAPEUTIC EXERCISES: CPT | Performed by: PHYSICAL THERAPIST

## 2022-12-06 NOTE — PROGRESS NOTES
[address], [address]  Phone: [phone]  Fax [phone]      Physical Therapy Treatment Note/ Progress Report:       Date:  2022    Patient Name:  Terry Olivas    :  1950  MRN: [id number]  Restrictions/Precautions:    Medical/Treatment Diagnosis Information:  Diagnosis: M54.2 (ICD-10-CM) - Cervicalgia  Treatment Diagnosis: M54.2 pain; decreased strength  Insurance/Certification information:  PT [facility] Information: [facility]  Physician Information:   Zulma Montero NP  Has the plan of care been signed (Y/N):        []  Yes  [x]  No     Date of Patient follow up with Physician:      Is this a Progress Report:     [x]  Yes  []  No        If Yes:  Date Range for reporting period:    Beginning 22    Progress report will be due (10 Rx or 30 days whichever is less):   06      Recertification will be due (POC Duration  / 90 days whichever is less):  23        Visit # Insurance Allowable Auth Required   In-person 5  30 []  Yes []  No    Telehealth     []  Yes []  No    Total        Functional Scale: FOTO=53    Date assessed:  22   Functional Scale: FOTO=64    Date assessed:  22  Latex Allergy:  [x]NO      []YES  Preferred Language for Healthcare:   []English       [x]other: Cypriot    Pain level:  0/10     SUBJECTIVE:  I have no pain and I have been doing my exercises. They are helpful.     OBJECTIVE:   Observation:   Test measurements:         CERV ROM     22  L 22  R   Cervical Flexion WNL   28°    Cervical Extension 50% pain   60°    Cervical SB 50% tight   45° 35°   Cervical rotation 50%   55° 65°             ROM Left Right     Shoulder Flex         Shoulder Abd         Shoulder ER         Shoulder IR         Grossly WNL                   Strength  Left Right     Shoulder Flex         Shoulder Scap         Shoulder ER 4/5 4/5 4+/5 4+/5   Shoulder IR 4/5 4/5 4+/5 4+/5   Deep neck flexors                   Reflexes Left Right     C5-6 Biceps         C5-6 Brachioradialis         C7-8 Triceps            Reflexes/Sensation:                []Dermatomes/Myotomes intact               []Reflexes equal and normal bilaterally              []Other:       RESTRICTIONS/PRECAUTIONS:      Exercises/Interventions:   Therapeutic Ex        Sets/sec Reps Notes   Neck retraction 3x10     Scapular retraction/middle rows 3x10 CC 15#     Standing shoulder extension 3x10 CC 10#     UT stretch [id number]     Prone HABD 2# 3x10     Serratus      ER     CC seated retraction 3x10    Lat pull down 3x10 CC 10#    Cervical rotation with self overpressure 10 sec  holdx3     Cervical flexion with self overpressure 10 sec hold x3                             Manual Intervention                                                     NMR re-education         Bilateral ER  2# 3x10     Wall angels                      Traction                                     Therapeutic Exercise and NMR EXR  [x] (17481) Provided verbal/tactile cueing for activities related to strengthening, flexibility, endurance, ROM  for improvements in cervical, postural, scapular, scapulothoracic and UE control with self care, reaching, carrying, lifting, house/yardwork, driving/computer work.    [] (48542) Provided verbal/tactile cueing for activities related to improving balance, coordination, kinesthetic sense, posture, motor skill, proprioception  to assist with cervical, scapular, scapulothoracic and UE control with self care, reaching, carrying, lifting, house/yardwork, driving/computer work.     Therapeutic Activities:    [x] (14466 or 83297) Provided verbal/tactile cueing for activities related to improving balance, coordination, kinesthetic sense, posture, motor skill, proprioception and motor activation to allow for proper function of cervical, scapular, scapulothoracic and UE control with self care, carrying, lifting, driving/computer work. Home Exercise Program:    [x] (23467) Reviewed/Progressed HEP activities related to strengthening, flexibility, endurance, ROM of cervical, scapular, scapulothoracic and UE control with self care, reaching, carrying, lifting, house/yardwork, driving/computer work  [] (69499) Reviewed/Progressed HEP activities related to improving balance, coordination, kinesthetic sense, posture, motor skill, proprioception of cervical, scapular, scapulothoracic and UE control with self care, reaching, carrying, lifting, house/yardwork, driving/computer work      Manual Treatments:  PROM / STM / Oscillations-Mobs:  G-I, II, III, IV (PA's, Inf., Post.)  [] (43613) Provided manual therapy to mobilize soft tissue/joints of cervical/CT, scapular GHJ and UE for the purpose of decreasing headache, modulating pain, promoting relaxation,  increasing ROM, reducing/eliminating soft tissue swelling/inflammation/restriction, improving soft tissue extensibility and allowing for proper ROM for normal function with self care, reaching, carrying, lifting, house/yardwork, driving/computer work    Modalities:      Charges  Timed Code Treatment Minutes: 45   Total Treatment Minutes: 45     [] EVAL (LOW) 52804   [] EVAL (MOD) 86583   [] EVAL (HIGH) 35758   [] RE-EVAL     [x] KE(57400) x 3  [] IONTO  [] NMR (35974) x     [] VASO  [] Manual (83389) x      [] Other:  [] TA x      [] Mech Traction (12834)  [] ES(attended) (39690)      [] ES (un) (85164):       GOALS:  Patient stated goal: typing better  [x] Progressing: [] Met: [] Not Met: [] Adjusted     Therapist goals for Patient:   Short Term Goals: To be achieved in: 2 weeks  1. Independent in HEP and progression per patient tolerance, in order to prevent re-injury. [x] Progressing: [] Met: [] Not Met: [] Adjusted  2. Patient will have a decrease in pain to facilitate improvement in movement, function, and ADLs as indicated by Functional Deficits.   [x] Progressing: [] Met: [] Not Met: [] Adjusted     Long Term Goals: To be achieved in: 6 weeks  1. FOTO 63 to assist with reaching prior level of function. [] Progressing: [x] Met: [] Not Met: [] Adjusted  2. Patient will demonstrate increased AROM to [employer] of cervical/thoracic spine to allow for proper joint functioning as indicated by patients Functional Deficits. [x] Progressing: [] Met: [] Not Met: [] Adjusted  3. Patient will demonstrate an increase in postural awareness and control and activation of  Deep cervical stabilizers to allow for proper functional mobility as indicated by patients Functional Deficits. [] Progressing: [] Met: [x] Not Met: [] Adjusted   4. Patient will return to  functional activities without increased symptoms or restriction. [] Progressing: [x] Met: [] Not Met: [] Adjusted  5. Pt will type without pain. [x] Progressing: [] Met: [] Not Met: [] Adjusted     Overall Progression Towards Functional goals/ Treatment Progress Update:  [x] Patient is progressing as expected towards functional goals listed. [] Progression is slowed due to complexities/Impairments listed. [] Progression has been slowed due to co-morbidities. [] Plan just implemented, too soon to assess goals progression <30days   [] Goals require adjustment due to lack of progress  [] Patient is not progressing as expected and requires additional follow up with physician  [] Other    Prognosis for POC: [x] Good [] Fair  [] Poor      Patient requires continued skilled intervention: [x] Yes  [] No      ASSESSMENT:  Pt presents with no pain with ROM and increased shoulder strength. No  present so difficult to cue for proper form with scapular retraction. Pt compensates with UT for majority of exercises. PT used tactile cues to encourage UT relaxation. Pt will continue to benefit from skilled PT to adjust movement faults and recruit proper muscles with UE movements in daily life.     Patient Education:  Access Code: [facility]  URL: [email or web address]  Date: 11/22/2022  Prepared by: Juli Ibarra    Exercises  Shoulder Extension with Resistance - 1 x daily - 7 x weekly - 3 sets - 10 reps  Scapular Retraction with Resistance - 1 x daily - 7 x weekly - 3 sets - 10 reps  Prone Shoulder Horizontal Abduction - 1 x daily - 7 x weekly - 3 sets - 10 reps  Standing Cervical Retraction - 1 x daily - 7 x weekly - 2 sets - 10 reps  Seated Cervical Sidebending Stretch - 1 x daily - 7 x weekly - 3 reps - 10 sec hold  Shoulder External Rotation and Scapular Retraction with Resistance - 1 x daily - 7 x weekly - 3 sets - 10 reps  Sidelying Shoulder External Rotation - 1 x daily - 7 x weekly - 3 sets - 10 reps  Supine Scapular Protraction in Flexion with Dumbbells - 1 x daily - 7 x weekly - 3 sets - 10 reps  Seated Lat Pull Down with Resistance - Elbows Bent - 1 x daily - 7 x weekly - 3 sets - 10 reps  Wall [place] - 1 x daily - 7 x weekly - 3 sets - 10 reps    Treatment/Activity Tolerance:  [x] Patient tolerated treatment well [] Patient limited by fatigue  [] Patient limited by pain  [] Patient limited by other medical complications  [] Other:     Prognosis: [x] Good [] Fair  [] Poor    Patient Requires Follow-up: [x] Yes  [] No    PLAN: Strength program.   [x] Continue per plan of care [] Alter current plan (see comments above)  [] Plan of care initiated [] Hold pending MD visit [] Discharge      Electronically signed by:  Juli Ibarra, PT  Yamilka Tidwell, SPT Therapist was present, directed the patient's care, made skilled judgement, and was responsible for assessment and treatment of the patient. Note: If patient does not return for scheduled/ recommended follow up visits, this note will serve as a discharge from care along with most recent update on progress.

## 2023-01-16 ENCOUNTER — HOSPITAL ENCOUNTER (OUTPATIENT)
Age: 73
Discharge: HOME | End: 2023-01-16
Payer: COMMERCIAL

## 2023-01-16 ENCOUNTER — HOSPITAL ENCOUNTER (OUTPATIENT)
Dept: GENERAL RADIOLOGY | Age: 73
Discharge: HOME | End: 2023-01-16
Payer: COMMERCIAL

## 2023-01-16 DIAGNOSIS — M54.2 NECK PAIN: ICD-10-CM

## 2023-01-16 PROCEDURE — 72040 X-RAY EXAM NECK SPINE 2-3 VW: CPT

## 2023-07-10 ENCOUNTER — HOSPITAL ENCOUNTER (OUTPATIENT)
Dept: CT IMAGING | Age: 73
Discharge: HOME | End: 2023-07-10
Payer: COMMERCIAL

## 2023-07-10 DIAGNOSIS — N39.0 URINARY TRACT INFECTION WITHOUT HEMATURIA, SITE UNSPECIFIED: ICD-10-CM

## 2023-07-10 DIAGNOSIS — I10 ESSENTIAL HYPERTENSION, MALIGNANT: ICD-10-CM

## 2023-07-10 PROCEDURE — 72192 CT PELVIS W/O DYE: CPT

## 2025-07-14 ENCOUNTER — PROCEDURE VISIT (OUTPATIENT)
Dept: AUDIOLOGY | Age: 75
End: 2025-07-14
Payer: COMMERCIAL

## 2025-07-14 DIAGNOSIS — H90.A22 SENSORINEURAL HEARING LOSS (SNHL) OF LEFT EAR WITH RESTRICTED HEARING OF RIGHT EAR: ICD-10-CM

## 2025-07-14 DIAGNOSIS — H90.A31 MIXED CONDUCTIVE AND SENSORINEURAL HEARING LOSS OF RIGHT EAR WITH RESTRICTED HEARING OF LEFT EAR: Primary | ICD-10-CM

## 2025-07-14 DIAGNOSIS — H93.11 TINNITUS OF RIGHT EAR: ICD-10-CM

## 2025-07-14 DIAGNOSIS — H93.8X1 EAR PRESSURE, RIGHT: ICD-10-CM

## 2025-07-14 PROCEDURE — 92567 TYMPANOMETRY: CPT | Performed by: AUDIOLOGIST

## 2025-07-14 PROCEDURE — 92557 COMPREHENSIVE HEARING TEST: CPT | Performed by: AUDIOLOGIST

## 2025-07-14 NOTE — PROGRESS NOTES
Devan Leblanc   1950, 74 y.o. male   [id number]       Referring Provider: Anne Marie Vasquez DO  Referral Type: Scanned document    Reason for Visit: Evaluation of the cause of disorders of hearing, tinnitus, or balance.    ADULT AUDIOLOGIC EVALUATION      Devan Leblanc is a 74 y.o. male seen today, 7/14/2025 , for an initial audiologic evaluation.  Patient was accompanied by daughter.  A [place] interpretor was used for the evaluation (session number [id number]) with interpretor Ryan ([id number])    AUDIOLOGIC AND OTHER PERTINENT MEDICAL HISTORY:      Devan Leblanc noted aural fullness and tinnitus.  Patient reports right ear hearing loss and fullness for the past 5 months.  He noted intermittent right ear tinnitus occurring when lying down in bed.    Devan Leblanc denied otalgia, dizziness, and history of ear surgery.    Date: 7/14/2025     IMPRESSIONS:      Abnormal middle ear pressure and compliance in the right ear and abnormal pressure with normal compliance in the left ear.  Abnormal asymmetric hearing loss, right worse than left, partially contributed from middle ear pathology.  Referred to otolaryngology due to asymmetric hearing loss and conductive component.  Follow medical recommendations of Anne Marie Vasquez DO.     ASSESSMENT AND FINDINGS:     Otoscopy revealed: Clear ear canals bilaterally    RIGHT EAR:  Hearing Sensitivity:Moderate to a profound mixed hearing loss from 250-8000 Hz  Speech Recognition Threshold: Could not test  Word Recognition:Could not test  Tympanometry: Flat, no peak pressure or compliance, Type B tympanogram, with typical ear canal volume, consistent with middle ear fluid.      LEFT EAR:  Hearing Sensitivity:Normal hearing sensitivity to a severe sensorineural hearing loss from 250-8000 Hz  Speech Recognition Threshold: Could not test  Word Recognition: Could not test  Tympanometry: Negative peak pressure with normal compliance, Type C tympanogram, consistent with ETD/history of

## 2025-08-05 ENCOUNTER — OFFICE VISIT (OUTPATIENT)
Dept: ENT CLINIC | Age: 75
End: 2025-08-05
Payer: COMMERCIAL

## 2025-08-05 VITALS
WEIGHT: 155 LBS | HEIGHT: 67 IN | HEART RATE: 76 BPM | BODY MASS INDEX: 24.33 KG/M2 | SYSTOLIC BLOOD PRESSURE: 138 MMHG | DIASTOLIC BLOOD PRESSURE: 84 MMHG

## 2025-08-05 DIAGNOSIS — H90.A31 MIXED CONDUCTIVE AND SENSORINEURAL HEARING LOSS OF RIGHT EAR WITH RESTRICTED HEARING OF LEFT EAR: Primary | ICD-10-CM

## 2025-08-05 DIAGNOSIS — H65.21 RIGHT CHRONIC SEROUS OTITIS MEDIA: ICD-10-CM

## 2025-08-05 PROCEDURE — 1036F TOBACCO NON-USER: CPT | Performed by: OTOLARYNGOLOGY

## 2025-08-05 PROCEDURE — 3075F SYST BP GE 130 - 139MM HG: CPT | Performed by: OTOLARYNGOLOGY

## 2025-08-05 PROCEDURE — 3017F COLORECTAL CA SCREEN DOC REV: CPT | Performed by: OTOLARYNGOLOGY

## 2025-08-05 PROCEDURE — G8427 DOCREV CUR MEDS BY ELIG CLIN: HCPCS | Performed by: OTOLARYNGOLOGY

## 2025-08-05 PROCEDURE — 3079F DIAST BP 80-89 MM HG: CPT | Performed by: OTOLARYNGOLOGY

## 2025-08-05 PROCEDURE — 69433 CREATE EARDRUM OPENING: CPT | Performed by: OTOLARYNGOLOGY

## 2025-08-05 PROCEDURE — 99203 OFFICE O/P NEW LOW 30 MIN: CPT | Performed by: OTOLARYNGOLOGY

## 2025-08-05 PROCEDURE — G8420 CALC BMI NORM PARAMETERS: HCPCS | Performed by: OTOLARYNGOLOGY

## 2025-08-05 PROCEDURE — 1123F ACP DISCUSS/DSCN MKR DOCD: CPT | Performed by: OTOLARYNGOLOGY

## 2025-08-05 RX ORDER — LISINOPRIL 10 MG/1
TABLET ORAL
COMMUNITY
Start: 2025-06-11

## 2025-08-05 RX ORDER — FLUTICASONE PROPIONATE 50 MCG
SPRAY, SUSPENSION (ML) NASAL
COMMUNITY
Start: 2025-06-11

## 2025-08-05 RX ORDER — CETIRIZINE HYDROCHLORIDE 10 MG/1
10 TABLET ORAL DAILY
COMMUNITY

## 2025-08-05 RX ORDER — VITAMIN B COMPLEX
1 CAPSULE ORAL DAILY
COMMUNITY

## 2025-08-05 RX ORDER — ATORVASTATIN CALCIUM 20 MG/1
20 TABLET, FILM COATED ORAL EVERY EVENING
COMMUNITY

## 2025-08-05 RX ORDER — MULTIVITAMIN
TABLET ORAL
COMMUNITY
Start: 2025-06-11

## 2025-08-05 ASSESSMENT — ENCOUNTER SYMPTOMS
APNEA: 0
EYE ITCHING: 0
TROUBLE SWALLOWING: 0
SINUS PRESSURE: 0
COUGH: 0
FACIAL SWELLING: 0
SORE THROAT: 0
VOICE CHANGE: 0
SHORTNESS OF BREATH: 0